# Patient Record
Sex: MALE | Race: WHITE | NOT HISPANIC OR LATINO | Employment: FULL TIME | URBAN - METROPOLITAN AREA
[De-identification: names, ages, dates, MRNs, and addresses within clinical notes are randomized per-mention and may not be internally consistent; named-entity substitution may affect disease eponyms.]

---

## 2018-12-20 ENCOUNTER — RECORDS - HEALTHEAST (OUTPATIENT)
Dept: LAB | Facility: CLINIC | Age: 38
End: 2018-12-20

## 2018-12-22 LAB — BACTERIA SPEC CULT: NORMAL

## 2023-08-22 ENCOUNTER — HOSPITAL ENCOUNTER (EMERGENCY)
Facility: HOSPITAL | Age: 43
Discharge: HOME | End: 2023-08-22
Attending: EMERGENCY MEDICINE | Admitting: EMERGENCY MEDICINE
Payer: COMMERCIAL

## 2023-08-22 ENCOUNTER — APPOINTMENT (OUTPATIENT)
Dept: MRI IMAGING | Facility: HOSPITAL | Age: 43
End: 2023-08-22
Attending: EMERGENCY MEDICINE
Payer: COMMERCIAL

## 2023-08-22 VITALS
RESPIRATION RATE: 27 BRPM | HEIGHT: 70 IN | BODY MASS INDEX: 29.79 KG/M2 | OXYGEN SATURATION: 96 % | HEART RATE: 66 BPM | WEIGHT: 208.1 LBS | TEMPERATURE: 97.8 F | DIASTOLIC BLOOD PRESSURE: 86 MMHG | SYSTOLIC BLOOD PRESSURE: 128 MMHG

## 2023-08-22 DIAGNOSIS — R47.01 APHASIA: ICD-10-CM

## 2023-08-22 LAB
ALBUMIN SERPL BCG-MCNC: 4.5 G/DL (ref 3.5–5.2)
ALP SERPL-CCNC: 97 U/L (ref 40–129)
ALT SERPL W P-5'-P-CCNC: 55 U/L (ref 0–70)
ANION GAP SERPL CALCULATED.3IONS-SCNC: 10 MMOL/L (ref 7–15)
AST SERPL W P-5'-P-CCNC: 33 U/L (ref 0–45)
BASOPHILS # BLD AUTO: 0.1 10E3/UL (ref 0–0.2)
BASOPHILS NFR BLD AUTO: 1 %
BILIRUB DIRECT SERPL-MCNC: <0.2 MG/DL (ref 0–0.3)
BILIRUB SERPL-MCNC: 0.3 MG/DL
BUN SERPL-MCNC: 14.4 MG/DL (ref 6–20)
CALCIUM SERPL-MCNC: 9.3 MG/DL (ref 8.6–10)
CHLORIDE SERPL-SCNC: 104 MMOL/L (ref 98–107)
CREAT SERPL-MCNC: 0.93 MG/DL (ref 0.67–1.17)
DEPRECATED HCO3 PLAS-SCNC: 27 MMOL/L (ref 22–29)
EOSINOPHIL # BLD AUTO: 0.1 10E3/UL (ref 0–0.7)
EOSINOPHIL NFR BLD AUTO: 1 %
ERYTHROCYTE [DISTWIDTH] IN BLOOD BY AUTOMATED COUNT: 12.4 % (ref 10–15)
GFR SERPL CREATININE-BSD FRML MDRD: >90 ML/MIN/1.73M2
GLUCOSE BLDC GLUCOMTR-MCNC: 116 MG/DL (ref 70–99)
GLUCOSE SERPL-MCNC: 94 MG/DL (ref 70–99)
HCT VFR BLD AUTO: 44.6 % (ref 40–53)
HGB BLD-MCNC: 15.4 G/DL (ref 13.3–17.7)
IMM GRANULOCYTES # BLD: 0 10E3/UL
IMM GRANULOCYTES NFR BLD: 0 %
LYMPHOCYTES # BLD AUTO: 2.4 10E3/UL (ref 0.8–5.3)
LYMPHOCYTES NFR BLD AUTO: 27 %
MCH RBC QN AUTO: 30.4 PG (ref 26.5–33)
MCHC RBC AUTO-ENTMCNC: 34.5 G/DL (ref 31.5–36.5)
MCV RBC AUTO: 88 FL (ref 78–100)
MONOCYTES # BLD AUTO: 0.8 10E3/UL (ref 0–1.3)
MONOCYTES NFR BLD AUTO: 9 %
NEUTROPHILS # BLD AUTO: 5.7 10E3/UL (ref 1.6–8.3)
NEUTROPHILS NFR BLD AUTO: 62 %
NRBC # BLD AUTO: 0 10E3/UL
NRBC BLD AUTO-RTO: 0 /100
PLATELET # BLD AUTO: 272 10E3/UL (ref 150–450)
POTASSIUM SERPL-SCNC: 4.1 MMOL/L (ref 3.4–5.3)
PROT SERPL-MCNC: 7.1 G/DL (ref 6.4–8.3)
RBC # BLD AUTO: 5.06 10E6/UL (ref 4.4–5.9)
SODIUM SERPL-SCNC: 141 MMOL/L (ref 136–145)
WBC # BLD AUTO: 9.1 10E3/UL (ref 4–11)

## 2023-08-22 PROCEDURE — 70549 MR ANGIOGRAPH NECK W/O&W/DYE: CPT

## 2023-08-22 PROCEDURE — 70553 MRI BRAIN STEM W/O & W/DYE: CPT

## 2023-08-22 PROCEDURE — 80053 COMPREHEN METABOLIC PANEL: CPT | Performed by: EMERGENCY MEDICINE

## 2023-08-22 PROCEDURE — 82248 BILIRUBIN DIRECT: CPT | Performed by: EMERGENCY MEDICINE

## 2023-08-22 PROCEDURE — 70544 MR ANGIOGRAPHY HEAD W/O DYE: CPT | Mod: XU

## 2023-08-22 PROCEDURE — 85004 AUTOMATED DIFF WBC COUNT: CPT | Performed by: EMERGENCY MEDICINE

## 2023-08-22 PROCEDURE — 36415 COLL VENOUS BLD VENIPUNCTURE: CPT | Performed by: EMERGENCY MEDICINE

## 2023-08-22 PROCEDURE — A9585 GADOBUTROL INJECTION: HCPCS | Mod: JZ | Performed by: EMERGENCY MEDICINE

## 2023-08-22 PROCEDURE — 99285 EMERGENCY DEPT VISIT HI MDM: CPT | Mod: 25

## 2023-08-22 PROCEDURE — 82962 GLUCOSE BLOOD TEST: CPT

## 2023-08-22 PROCEDURE — 255N000002 HC RX 255 OP 636: Mod: JZ | Performed by: EMERGENCY MEDICINE

## 2023-08-22 RX ORDER — GADOBUTROL 604.72 MG/ML
10 INJECTION INTRAVENOUS ONCE
Status: COMPLETED | OUTPATIENT
Start: 2023-08-22 | End: 2023-08-22

## 2023-08-22 RX ADMIN — GADOBUTROL 10 ML: 604.72 INJECTION INTRAVENOUS at 18:55

## 2023-08-22 ASSESSMENT — ACTIVITIES OF DAILY LIVING (ADL)
ADLS_ACUITY_SCORE: 35
ADLS_ACUITY_SCORE: 35

## 2023-08-22 ASSESSMENT — ENCOUNTER SYMPTOMS
NUMBNESS: 1
VOMITING: 0
DIARRHEA: 0
SPEECH DIFFICULTY: 1

## 2023-08-22 NOTE — ED TRIAGE NOTES
Patient presents to ER with spouse for stroke like symptoms that started today at 11am while working.  Was on a phone call meeting and felt left arm numbness and left sided head pain.  Loss of speech that lasted about 30sec-1minute.      Currently denies headache, numbness, and speech is back to baseline per patient at this time.     Went to urgent care and referred here.      Neuro eval done in triage by Dr. Castaneda.      Triage Assessment       Row Name 08/22/23 2712       Triage Assessment (Adult)    Airway WDL WDL       Respiratory WDL    Respiratory WDL WDL       Skin Circulation/Temperature WDL    Skin Circulation/Temperature WDL WDL       Cardiac WDL    Cardiac WDL WDL       Peripheral/Neurovascular WDL    Peripheral Neurovascular WDL WDL  originally had numbness/tingling sensation when symptoms started       Cognitive/Neuro/Behavioral WDL    Cognitive/Neuro/Behavioral WDL WDL  slurred speech when it all started at 1100

## 2023-08-22 NOTE — ED PROVIDER NOTES
EMERGENCY DEPARTMENT ENCOUNTER      NAME: Manish Montero  AGE: 43 year old male  YOB: 1980  MRN: [id number]  EVALUATION DATE & TIME: No admission date for patient encounter.    PCP: No primary care provider on file.    ED PROVIDER: Denny Castaneda M.D.      Chief Complaint   Patient presents with    Stroke Symptoms         FINAL IMPRESSION:  1. Aphasia          ED COURSE & MEDICAL DECISION MAKIN:45 PM I met with the patient, obtained history, performed an initial exam, and discussed options and plan for diagnostics and treatment here in the ED.   8:47 PM I rechecked and updated the patient.      Pertinent Labs & Imaging studies reviewed. (See chart for details)  43 year old male presents to the Emergency Department for evaluation of brief episode of aphasia, associated with headache and some left arm numbness.  Completely resolved on arrival here.  Discussed with stroke neurology, who recommended MRI.  This was unremarkable, no evidence for acute bleed, stroke, tumor, etc.  Incidental findings discussed with the patient, who remains symptomatic care.  Discussed possible TIA diagnosis, though he has no significant risk factors for this.  Advised primary care and neurologic follow-up, return precautions and aspirin initiation daily. At the conclusion of the encounter I discussed  the results of all of the tests and the disposition with the patient.   All questions were answered.  The patient acknowledged understanding and was involved in the decision making regarding the overall care plan.     I discussed with patient the utility, limitations and findings of the exam/interventions/studies done during this visit as well as the list of differential diagnosis and symptoms for which to monitor/return to ER.  Additional verbal discharge instructions were provided.       ED Course as of 08/22/23 2158   Tue Aug 22, 2023   2009 MR Brain w/o & w Contrast       At the conclusion of the encounter  I discussed the results of all of the tests and the disposition. The questions were answered. The patient or family acknowledged understanding and was agreeable with the care plan.     Medical Decision Making    History:  Supplemental history from: Family Member/Significant Other  External Record(s) reviewed: Documented in chart, if applicable.    Work Up:  Chart documentation includes differential considered and any EKGs or imaging independently interpreted by provider, where specified.  In additional to work up documented, I considered the following work up: Documented in chart, if applicable.    External consultation:  Discussion of management with another provider: Neurology    Complicating factors:  Care impacted by chronic illness: N/A  Care affected by social determinants of health: N/A    Disposition considerations: Discharge. No recommendations on prescription strength medication(s). I considered admission, but ultimately discharged patient after negative MRI.       MEDICATIONS GIVEN IN THE EMERGENCY:  Medications   gadobutrol (GADAVIST) injection 10 mL (10 mLs Intravenous $Given 8/22/23 1470)       NEW PRESCRIPTIONS STARTED AT TODAY'S ER VISIT  There are no discharge medications for this patient.          =================================================================    HPI    Patient information was obtained from: Patient    Use of : N/A         Manish Montero is a 43 year old male with no pertinent history upon review who presents to this ED via walk-in for evaluation of stroke symptoms.    Patient had an episode of left arm numbness, visual disturbance worse in left eye, headache over left side, and inability to speak/slurred speech at around 1100 (6 hours ago) today. He notes the episode lasted about 1-2 minutes before his symptoms went away. He went to urgent care and was referred to ED. He is back to baseline now in ED. Patient drinks alcohol infrequently. He denies smoking or  "other drug use. Patient also denies any daily medications or history of neurological conditions. He denies any recent illness, diarrhea, vomiting, or any other complaints at this time.       REVIEW OF SYSTEMS   Review of Systems   Eyes:  Positive for visual disturbance.   Gastrointestinal:  Negative for diarrhea and vomiting.   Neurological:  Positive for speech difficulty and numbness.        PAST MEDICAL HISTORY:  No past medical history on file.    PAST SURGICAL HISTORY:  No past surgical history on file.        CURRENT MEDICATIONS:    No current facility-administered medications for this encounter.  No current outpatient medications on file.    ALLERGIES:  No Known Allergies    FAMILY HISTORY:  No family history on file.    SOCIAL HISTORY:        VITALS:  /86   Pulse 66   Temp 97.8  F (36.6  C) (Oral)   Resp 27   Ht 1.778 m (5' 10\")   Wt 94.4 kg (208 lb 1.6 oz)   SpO2 96%   BMI 29.86 kg/m      PHYSICAL EXAM    Physical Exam  Constitutional:       General: He is not in acute distress.     Appearance: Normal appearance. He is not toxic-appearing.   HENT:      Head: Atraumatic.   Eyes:      General: No scleral icterus.     Conjunctiva/sclera: Conjunctivae normal.   Cardiovascular:      Rate and Rhythm: Normal rate.      Heart sounds: Normal heart sounds.   Pulmonary:      Effort: Pulmonary effort is normal. No respiratory distress.      Breath sounds: Normal breath sounds.   Abdominal:      Palpations: Abdomen is soft.      Tenderness: There is no abdominal tenderness.   Musculoskeletal:         General: No deformity.      Cervical back: Neck supple.   Skin:     General: Skin is warm.   Neurological:      Mental Status: He is alert and oriented to person, place, and time.      Cranial Nerves: No cranial nerve deficit.      Sensory: No sensory deficit.      Motor: No weakness.      Coordination: Coordination normal.      Gait: Gait normal.            LAB:  All pertinent labs reviewed and " interpreted.  Results for orders placed or performed during the hospital encounter of 08/22/23   MR Brain w/o & w Contrast    Impression    IMPRESSION:  1.  No acute infarct.  2.  Scattered nonspecific punctate foci of T2/FLAIR hyperintense signal in the cerebral white matter. Expanded differential includes chronic microvascular disease, demyelination, inflammatory disorders, Lyme disease, migraines, nonspecific gliosis, among   other etiologies.   3.  Small pineal cyst.     MRA Brain ([place] [facility]) wo Contrast    Impression    IMPRESSION:  1.  No significant stenosis or occlusion.     MRA Neck (Carotids) wo & w Contrast    Impression    IMPRESSION:  1.  No significant stenosis, occlusion, dissection.     Basic metabolic panel   Result Value Ref Range    Sodium 141 136 - 145 mmol/L    Potassium 4.1 3.4 - 5.3 mmol/L    Chloride 104 98 - 107 mmol/L    Carbon Dioxide (CO2) 27 22 - 29 mmol/L    Anion Gap 10 7 - 15 mmol/L    Urea Nitrogen 14.4 6.0 - 20.0 mg/dL    Creatinine 0.93 0.67 - 1.17 mg/dL    Calcium 9.3 8.6 - 10.0 mg/dL    Glucose 94 70 - 99 mg/dL    GFR Estimate >90 >60 mL/min/1.73m2   Hepatic function panel   Result Value Ref Range    Protein Total 7.1 6.4 - 8.3 g/dL    Albumin 4.5 3.5 - 5.2 g/dL    Bilirubin Total 0.3 <=1.2 mg/dL    Alkaline Phosphatase 97 40 - 129 U/L    AST 33 0 - 45 U/L    ALT 55 0 - 70 U/L    Bilirubin Direct <0.20 0.00 - 0.30 mg/dL   Glucose by meter   Result Value Ref Range    GLUCOSE BY METER POCT 116 (H) 70 - 99 mg/dL   CBC with platelets and differential   Result Value Ref Range    WBC Count 9.1 4.0 - 11.0 10e3/uL    RBC Count 5.06 4.40 - 5.90 10e6/uL    Hemoglobin 15.4 13.3 - 17.7 g/dL    Hematocrit 44.6 40.0 - 53.0 %    MCV 88 78 - 100 fL    MCH 30.4 26.5 - 33.0 pg    MCHC 34.5 31.5 - 36.5 g/dL    RDW 12.4 10.0 - 15.0 %    Platelet Count 272 150 - 450 10e3/uL    % Neutrophils 62 %    % Lymphocytes 27 %    % Monocytes 9 %    % Eosinophils 1 %    % Basophils 1 %    % Immature  Granulocytes 0 %    NRBCs per 100 WBC 0 <1 /100    Absolute Neutrophils 5.7 1.6 - 8.3 10e3/uL    Absolute Lymphocytes 2.4 0.8 - 5.3 10e3/uL    Absolute Monocytes 0.8 0.0 - 1.3 10e3/uL    Absolute Eosinophils 0.1 0.0 - 0.7 10e3/uL    Absolute Basophils 0.1 0.0 - 0.2 10e3/uL    Absolute Immature Granulocytes 0.0 <=0.4 10e3/uL    Absolute NRBCs 0.0 10e3/uL       RADIOLOGY:  I independently interpreted the below imaging showing no large stroke.   Please see official radiology report.  MRA Neck (Carotids) wo & w Contrast   Final Result   IMPRESSION:   1.  No significant stenosis, occlusion, dissection.         MRA Brain ([facility]) wo Contrast   Final Result   IMPRESSION:   1.  No significant stenosis or occlusion.         MR Brain w/o & w Contrast   Final Result   IMPRESSION:   1.  No acute infarct.   2.  Scattered nonspecific punctate foci of T2/FLAIR hyperintense signal in the cerebral white matter. Expanded differential includes chronic microvascular disease, demyelination, inflammatory disorders, Lyme disease, migraines, nonspecific gliosis, among    other etiologies.    3.  Small pineal cyst.                  I, Enoc Wesley, am serving as a scribe to document services personally performed by Dr. Castaneda based on my observation and the provider's statements to me. I, Denny Castaneda MD attest that Enoc Wesley is acting in a scribe capacity, has observed my performance of the services and has documented them in accordance with my direction.    Denny Castaneda M.D.  Emergency Medicine  [facility] EMERGENCY DEPARTMENT  [address] [id number] [phone]  Dept: [phone]       Denny Castaneda MD  08/22/23 7035

## 2023-08-23 NOTE — CONSULTS
"  [facility]    Stroke Telephone Note    Dr. Sarmiento was called by Denny Castaneda on 08/22/23 regarding patient Manish Montero. The patient is a 43 year old male who comes in 30 sec- 1.5 minute transient aphasia and L arm numbness and transient vision changes at 11 AM today. ED called re: doing MRI Brain and MRA Head and Neck instead of CT and CTA initially. Was okayed for the same. Further history to be received from ED physician when he calls back with the MRI since it was a brief interaction re:imaging.    BP (!) 159/97   Pulse 81   Temp 97.8  F (36.6  C) (Oral)   Resp 27   Ht 1.778 m (5' 10\")   Wt 94.4 kg (208 lb 1.6 oz)   SpO2 96%   BMI 29.86 kg/m       Imaging Findings  MRI:  MRA:    Impression  Transient Aphasia, numbness and vision changes    Recommendations  MRI Brain w/wo contrast   Please call back once MRI is done    Case will be discussed with vascular neurology attending Dr. Sims    My recommendations are based on the information provided over the phone by Manish Cedenotristangary's in-person providers. They are not intended to replace the clinical judgment of his in-person providers. I was not requested to personally see or examine the patient at this time.      Glendy Sarmiento MD  Vascular Neurology Fellow    To page me or covering stroke neurology team member, click here: [facility]  Choose \"On Call\" tab at top, then select \"NEUROLOGY/ALL SITES\" from middle drop-down box, press Enter, then look for \"stroke\" or \"telestroke\" for your site.   "

## 2023-08-23 NOTE — DISCHARGE INSTRUCTIONS
Your MRIs were reassuring today and there is no evidence for acute stroke or tumor.  If you have recurrent symptoms I would still get reassessed immediately in the ER.  Otherwise follow-up with your doctor or neurology and consider daily aspirin.

## 2023-08-23 NOTE — ED NOTES
Bed: JNED-B  Expected date: 8/22/23  Expected time: 5:53 PM  Means of arrival: Walked  Comments:  
Pt at MRI  
Report given Krish SWENSON.  
26-Jul-2020 23:24

## 2023-09-22 ENCOUNTER — LAB REQUISITION (OUTPATIENT)
Dept: LAB | Facility: CLINIC | Age: 43
End: 2023-09-22

## 2023-09-22 DIAGNOSIS — R73.9 HYPERGLYCEMIA, UNSPECIFIED: ICD-10-CM

## 2023-09-22 PROCEDURE — 80061 LIPID PANEL: CPT | Performed by: FAMILY MEDICINE

## 2023-09-22 PROCEDURE — 83036 HEMOGLOBIN GLYCOSYLATED A1C: CPT | Performed by: FAMILY MEDICINE

## 2023-09-23 LAB
CHOLEST SERPL-MCNC: 232 MG/DL
HBA1C MFR BLD: 5.7 %
HDLC SERPL-MCNC: 53 MG/DL
LDLC SERPL CALC-MCNC: 161 MG/DL
NONHDLC SERPL-MCNC: 179 MG/DL
TRIGL SERPL-MCNC: 91 MG/DL

## 2023-10-21 ENCOUNTER — HEALTH MAINTENANCE LETTER (OUTPATIENT)
Age: 43
End: 2023-10-21

## 2024-12-08 ENCOUNTER — HEALTH MAINTENANCE LETTER (OUTPATIENT)
Age: 44
End: 2024-12-08